# Patient Record
Sex: FEMALE | Race: WHITE | NOT HISPANIC OR LATINO | Employment: FULL TIME | URBAN - METROPOLITAN AREA
[De-identification: names, ages, dates, MRNs, and addresses within clinical notes are randomized per-mention and may not be internally consistent; named-entity substitution may affect disease eponyms.]

---

## 2018-04-12 ENCOUNTER — TRANSCRIBE ORDERS (OUTPATIENT)
Dept: ADMINISTRATIVE | Facility: HOSPITAL | Age: 43
End: 2018-04-12

## 2018-04-12 DIAGNOSIS — H57.02 ANISOCORIA: Primary | ICD-10-CM

## 2018-04-16 ENCOUNTER — APPOINTMENT (OUTPATIENT)
Dept: MRI IMAGING | Facility: HOSPITAL | Age: 43
End: 2018-04-16
Attending: OPHTHALMOLOGY

## 2018-04-18 ENCOUNTER — OFFICE VISIT (OUTPATIENT)
Dept: FAMILY MEDICINE CLINIC | Facility: CLINIC | Age: 43
End: 2018-04-18

## 2018-04-18 VITALS
OXYGEN SATURATION: 99 % | BODY MASS INDEX: 25.69 KG/M2 | HEIGHT: 63 IN | RESPIRATION RATE: 12 BRPM | SYSTOLIC BLOOD PRESSURE: 114 MMHG | HEART RATE: 69 BPM | DIASTOLIC BLOOD PRESSURE: 72 MMHG | WEIGHT: 145 LBS

## 2018-04-18 DIAGNOSIS — Z13.1 SCREENING FOR DIABETES MELLITUS: ICD-10-CM

## 2018-04-18 DIAGNOSIS — Z00.00 ANNUAL PHYSICAL EXAM: Primary | ICD-10-CM

## 2018-04-18 DIAGNOSIS — Z23 NEED FOR VACCINATION: ICD-10-CM

## 2018-04-18 DIAGNOSIS — H57.02 PUPIL DIAMETER UNEQUAL: ICD-10-CM

## 2018-04-18 DIAGNOSIS — Z13.220 SCREENING FOR HYPERLIPIDEMIA: ICD-10-CM

## 2018-04-18 PROCEDURE — 90472 IMMUNIZATION ADMIN EACH ADD: CPT | Performed by: FAMILY MEDICINE

## 2018-04-18 PROCEDURE — 99386 PREV VISIT NEW AGE 40-64: CPT | Performed by: FAMILY MEDICINE

## 2018-04-18 PROCEDURE — 90471 IMMUNIZATION ADMIN: CPT | Performed by: FAMILY MEDICINE

## 2018-04-18 PROCEDURE — 90632 HEPA VACCINE ADULT IM: CPT | Performed by: FAMILY MEDICINE

## 2018-04-18 PROCEDURE — 90715 TDAP VACCINE 7 YRS/> IM: CPT | Performed by: FAMILY MEDICINE

## 2018-04-18 RX ORDER — CETIRIZINE HYDROCHLORIDE 10 MG/1
10 TABLET ORAL DAILY
COMMUNITY

## 2018-04-18 NOTE — PROGRESS NOTES
Subjective   Malathi Lee is a 42 y.o. female. Patient is here today for   Chief Complaint   Patient presents with   • Annual Exam     Physical   • Establish Care   • eye concern            Malathi Lee presents for an Annual Wellness Visit.  she has a history of There is no problem list on file for this patient.      HPI      Health Habits:  Dental Exam. up to date  Eye Exam. up to date  Exercise: 2 times/week.  Current exercise activities include: walking and barre and elliptical   No mental health concerns   and monogamous  Pt would like to get Tdap and Hep A vaccines today  She is not fasting this morning  OB/GYN is Dr. Judith Issa   Pap smear is UTD and WNL per pt report  First mammogram at age 41 was normal per pt report  Menses are regular  Previous PCP Dr. Ball    Eye concerns:  -one of the nurses she works with recently noticed that her pupils are different sizes  -she asked one of the oncologists she works with to examine her; exam normal  -she went to see an Ophthalmologist, Dr. Marilee Vasquez and her exam and ocular imaging were normal  -she has an appointment with a Neuro-ophthalmologist, Dr. Harry at [facility] on 18  -her insurer denied an MRI brain b/c she did not have any other symptoms   -no pain, blurry vision, or nausea  -no hx of head trauma    The following portions of the patient's history were reviewed and updated as appropriate: allergies, current medications, past family history, past medical history, past social history, past surgical history and problem list.    Past Medical History:   Diagnosis Date   • Allergic      Past Surgical History:   Procedure Laterality Date   •  SECTION      2   • MOUTH SURGERY     • VAGINAL DELIVERY     • WISDOM TOOTH EXTRACTION       Family History   Problem Relation Age of Onset   • Adopted: Yes        Allergies no known allergies     Social History     Social History   • Marital status:      Social History Main Topics   • Smoking  status: Former Smoker   • Smokeless tobacco: Never Used      Comment: smoked btwn ages [age]   • Alcohol use Yes      Comment: about 1-4 drinks per week   • Drug use: No   • Sexual activity: Yes     Partners: Male     Birth control/ protection: Surgical      Comment:  had a vasectomy     Social History Narrative    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  Lives at home with her , 3 children, and 1 dog.        Medications:  Zyrtec 10 mg daily       Review of Systems   Constitutional: Negative for fatigue, fever and unexpected weight change.   HENT: Negative for congestion and sore throat.    Eyes: Negative for photophobia, pain, discharge, redness, itching and visual disturbance.        Unequal pupils   Respiratory: Negative for cough and shortness of breath.    Cardiovascular: Negative for chest pain and palpitations.   Gastrointestinal: Negative for abdominal pain, blood in stool, constipation, diarrhea, nausea and vomiting.   Genitourinary: Negative for dysuria and menstrual problem.        Occasional stress incontinence of urine ever since her last pregnancy 10 yr ago     Skin: Negative for rash.   Neurological: Negative for dizziness, weakness, light-headedness, numbness and headaches.   Psychiatric/Behavioral: Negative for dysphoric mood. The patient is not nervous/anxious.          Objective       Vitals:    04/18/18 0805   BP: 114/72   Pulse: 69   Resp: 12   SpO2: 99%   BMI 25    Physical Exam   Constitutional: Vital signs are normal. She appears well-developed and well-nourished. No distress.   HENT:   Head: Normocephalic and atraumatic.   Nose: Nose normal.   Mouth/Throat: Oropharynx is clear and moist.   Eyes: Conjunctivae and EOM are normal. Pupils are unequal.   L pupil larger than R pupil, however both pupils react to light and accomodation simultaneously   Neck: No thyromegaly present.   Cardiovascular: Normal rate, regular rhythm, S1 normal, S2  normal and normal heart sounds.  Exam reveals no gallop and no friction rub.    No murmur heard.  Pulses:       Radial pulses are 2+ on the right side, and 2+ on the left side.   Pulmonary/Chest: Effort normal and breath sounds normal. She has no wheezes. She has no rales.   Abdominal: Soft. Bowel sounds are normal. She exhibits no distension. There is no hepatosplenomegaly. There is no tenderness. There is no rebound and no guarding.   Musculoskeletal: She exhibits no edema or deformity.   Lymphadenopathy:     She has no cervical adenopathy.        Right: No supraclavicular adenopathy present.        Left: No supraclavicular adenopathy present.   Neurological: She is alert. She has normal strength. Gait normal.   Skin: Skin is warm and dry. No cyanosis. Nails show no clubbing.   Psychiatric: She has a normal mood and affect. Her speech is normal and behavior is normal.   Nursing note and vitals reviewed.      ASSESSMENT/PLAN       Problem List Items Addressed This Visit        Other    Pupil diameter unequal      Other Visit Diagnoses     Annual physical exam    -  Primary    Relevant Orders    CBC & Differential    Hemoglobin A1c    Lipid Panel With LDL / HDL Ratio    TSH    Tdap Vaccine Greater Than or Equal To 6yo IM    Hepatitis A Vaccine Adult IM    Comprehensive Metabolic Panel    Need for vaccination        Relevant Orders    Tdap Vaccine Greater Than or Equal To 6yo IM    Hepatitis A Vaccine Adult IM    Screening for diabetes mellitus        Relevant Orders    Hemoglobin A1c    Comprehensive Metabolic Panel    Screening for hyperlipidemia        Relevant Orders    Lipid Panel With LDL / HDL Ratio            Patient Instructions   Try to exercise at least 3 times per week for 30 minutes per session.    Please fill out a record release form.    Please schedule a fasting lab appointment within the next month.      Follow up with Neuroophthalmology as scheduled and please ask them to forward records to this  office.        Return in about 1 year (around 4/18/2019), or if symptoms worsen or fail to improve.

## 2018-04-18 NOTE — PATIENT INSTRUCTIONS
Try to exercise at least 3 times per week for 30 minutes per session.    Please fill out a record release form.    Please schedule a fasting lab appointment within the next month.      Follow up with Neuroophthalmology as scheduled and please ask them to forward records to this office.

## 2018-05-16 ENCOUNTER — TRANSCRIBE ORDERS (OUTPATIENT)
Dept: ADMINISTRATIVE | Facility: HOSPITAL | Age: 43
End: 2018-05-16

## 2018-05-16 DIAGNOSIS — G90.2 HORNERS SYNDROME: Primary | ICD-10-CM

## 2018-05-17 ENCOUNTER — LAB (OUTPATIENT)
Dept: LAB | Facility: HOSPITAL | Age: 43
End: 2018-05-17

## 2018-05-17 DIAGNOSIS — Z13.220 SCREENING FOR HYPERLIPIDEMIA: ICD-10-CM

## 2018-05-17 DIAGNOSIS — Z13.1 SCREENING FOR DIABETES MELLITUS: ICD-10-CM

## 2018-05-17 DIAGNOSIS — Z00.00 ANNUAL PHYSICAL EXAM: ICD-10-CM

## 2018-05-17 LAB
ALBUMIN SERPL-MCNC: 4.3 G/DL (ref 3.5–5.2)
ALBUMIN/GLOB SERPL: 1.5 G/DL
ALP SERPL-CCNC: 50 U/L (ref 39–117)
ALT SERPL W P-5'-P-CCNC: 12 U/L (ref 1–33)
ANION GAP SERPL CALCULATED.3IONS-SCNC: 8.4 MMOL/L
AST SERPL-CCNC: 18 U/L (ref 1–32)
BASOPHILS # BLD AUTO: 0.01 10*3/MM3 (ref 0–0.2)
BASOPHILS NFR BLD AUTO: 0.2 % (ref 0–1.5)
BILIRUB SERPL-MCNC: 0.3 MG/DL (ref 0.1–1.2)
BUN BLD-MCNC: 12 MG/DL (ref 6–20)
BUN/CREAT SERPL: 14.3 (ref 7–25)
CALCIUM SPEC-SCNC: 9.1 MG/DL (ref 8.6–10.5)
CHLORIDE SERPL-SCNC: 106 MMOL/L (ref 98–107)
CHOLEST SERPL-MCNC: 182 MG/DL (ref 0–200)
CO2 SERPL-SCNC: 27.6 MMOL/L (ref 22–29)
CREAT BLD-MCNC: 0.84 MG/DL (ref 0.57–1)
DEPRECATED RDW RBC AUTO: 43.5 FL (ref 37–54)
EOSINOPHIL # BLD AUTO: 0.17 10*3/MM3 (ref 0–0.7)
EOSINOPHIL NFR BLD AUTO: 3.3 % (ref 0.3–6.2)
ERYTHROCYTE [DISTWIDTH] IN BLOOD BY AUTOMATED COUNT: 13.3 % (ref 11.7–13)
GFR SERPL CREATININE-BSD FRML MDRD: 75 ML/MIN/1.73
GLOBULIN UR ELPH-MCNC: 2.8 GM/DL
GLUCOSE BLD-MCNC: 94 MG/DL (ref 65–99)
HBA1C MFR BLD: 5.03 % (ref 4.8–5.6)
HCT VFR BLD AUTO: 42.3 % (ref 35.6–45.5)
HDLC SERPL-MCNC: 55 MG/DL (ref 40–60)
HGB BLD-MCNC: 13.8 G/DL (ref 11.9–15.5)
IMM GRANULOCYTES # BLD: 0.01 10*3/MM3 (ref 0–0.03)
IMM GRANULOCYTES NFR BLD: 0.2 % (ref 0–0.5)
LDLC SERPL CALC-MCNC: 116 MG/DL (ref 0–100)
LDLC/HDLC SERPL: 2.12 {RATIO}
LYMPHOCYTES # BLD AUTO: 1.57 10*3/MM3 (ref 0.9–4.8)
LYMPHOCYTES NFR BLD AUTO: 30.7 % (ref 19.6–45.3)
MCH RBC QN AUTO: 29.2 PG (ref 26.9–32)
MCHC RBC AUTO-ENTMCNC: 32.6 G/DL (ref 32.4–36.3)
MCV RBC AUTO: 89.6 FL (ref 80.5–98.2)
MONOCYTES # BLD AUTO: 0.34 10*3/MM3 (ref 0.2–1.2)
MONOCYTES NFR BLD AUTO: 6.6 % (ref 5–12)
NEUTROPHILS # BLD AUTO: 3.03 10*3/MM3 (ref 1.9–8.1)
NEUTROPHILS NFR BLD AUTO: 59.2 % (ref 42.7–76)
PLATELET # BLD AUTO: 172 10*3/MM3 (ref 140–500)
PMV BLD AUTO: 12.2 FL (ref 6–12)
POTASSIUM BLD-SCNC: 4.6 MMOL/L (ref 3.5–5.2)
PROT SERPL-MCNC: 7.1 G/DL (ref 6–8.5)
RBC # BLD AUTO: 4.72 10*6/MM3 (ref 3.9–5.2)
SODIUM BLD-SCNC: 142 MMOL/L (ref 136–145)
TRIGL SERPL-MCNC: 53 MG/DL (ref 0–150)
TSH SERPL DL<=0.05 MIU/L-ACNC: 2.76 MIU/ML (ref 0.27–4.2)
VLDLC SERPL-MCNC: 10.6 MG/DL (ref 5–40)
WBC NRBC COR # BLD: 5.12 10*3/MM3 (ref 4.5–10.7)

## 2018-05-17 PROCEDURE — 36415 COLL VENOUS BLD VENIPUNCTURE: CPT | Performed by: FAMILY MEDICINE

## 2018-05-17 PROCEDURE — 80061 LIPID PANEL: CPT

## 2018-05-17 PROCEDURE — 83036 HEMOGLOBIN GLYCOSYLATED A1C: CPT

## 2018-05-17 PROCEDURE — 80053 COMPREHEN METABOLIC PANEL: CPT | Performed by: FAMILY MEDICINE

## 2018-05-17 PROCEDURE — 84443 ASSAY THYROID STIM HORMONE: CPT

## 2018-05-17 PROCEDURE — 85025 COMPLETE CBC W/AUTO DIFF WBC: CPT

## 2018-05-18 ENCOUNTER — TELEPHONE (OUTPATIENT)
Dept: FAMILY MEDICINE CLINIC | Facility: CLINIC | Age: 43
End: 2018-05-18

## 2018-05-18 NOTE — TELEPHONE ENCOUNTER
Left VM to let patient know about her lab results and encourage to work on diet and exercise.     ----- Message from Akanksha Beckwith MD sent at 5/18/2018  8:17 AM EDT -----  Please contact pt and let her know that her cholesterol is a little high, but not to the degree that she needs medication at this time.   Exercising regularly and eating a healthy diet rich in fresh produce, fiber, and lean protein but low in added sugar and fat can help manage this problem and reduce the risk for heart disease in the future.    The rest of her labs looked good: her blood sugar, kidney function, liver enzymes, blood counts, and thyroid hormone were normal.

## 2018-05-23 ENCOUNTER — HOSPITAL ENCOUNTER (OUTPATIENT)
Dept: MRI IMAGING | Facility: HOSPITAL | Age: 43
Discharge: HOME | End: 2018-05-23
Admitting: PSYCHIATRY & NEUROLOGY

## 2018-05-23 ENCOUNTER — HOSPITAL ENCOUNTER (OUTPATIENT)
Dept: CT IMAGING | Facility: HOSPITAL | Age: 43
Discharge: HOME | End: 2018-05-23

## 2018-05-23 DIAGNOSIS — G90.2 HORNERS SYNDROME: ICD-10-CM

## 2018-05-23 PROCEDURE — 70553 MRI BRAIN STEM W/O & W/DYE: CPT

## 2018-05-23 PROCEDURE — 0 GADOBENATE DIMEGLUMINE 529 MG/ML SOLUTION: Performed by: PSYCHIATRY & NEUROLOGY

## 2018-05-23 PROCEDURE — A9577 INJ MULTIHANCE: HCPCS | Performed by: PSYCHIATRY & NEUROLOGY

## 2018-05-23 PROCEDURE — 70498 CT ANGIOGRAPHY NECK: CPT

## 2018-05-23 PROCEDURE — 25010000002 IOPAMIDOL 61 % SOLUTION: Performed by: FAMILY MEDICINE

## 2018-05-23 PROCEDURE — 71260 CT THORAX DX C+: CPT

## 2018-05-23 RX ADMIN — IOPAMIDOL 95 ML: 612 INJECTION, SOLUTION INTRAVENOUS at 09:00

## 2018-05-23 RX ADMIN — GADOBENATE DIMEGLUMINE 13 ML: 529 INJECTION, SOLUTION INTRAVENOUS at 08:17

## 2018-06-08 ENCOUNTER — OFFICE VISIT (OUTPATIENT)
Dept: NEUROSURGERY | Facility: CLINIC | Age: 43
End: 2018-06-08

## 2018-06-08 VITALS
HEIGHT: 63 IN | WEIGHT: 141 LBS | DIASTOLIC BLOOD PRESSURE: 60 MMHG | HEART RATE: 68 BPM | RESPIRATION RATE: 16 BRPM | SYSTOLIC BLOOD PRESSURE: 90 MMHG | BODY MASS INDEX: 24.98 KG/M2

## 2018-06-08 DIAGNOSIS — H57.02 PUPIL DIAMETER UNEQUAL: Primary | ICD-10-CM

## 2018-06-08 DIAGNOSIS — H57.02 ANISOCORIA: ICD-10-CM

## 2018-06-08 PROCEDURE — 99243 OFF/OP CNSLTJ NEW/EST LOW 30: CPT | Performed by: NEUROLOGICAL SURGERY

## 2018-06-08 NOTE — PROGRESS NOTES
"Subjective   Patient ID: Malathi Lee is a 42 y.o. female is being seen for consultation today at the request of Akanksha Timmons for pupil inequity since March. She was evaluated by Dr. Smith, then referred to neuro-ophthalmology (Dr. Whitaker) and has undergone CTA, MRI.   History of Present Illness     She presents to the office today for consultation of anisocoria.  She has undergone MRI imaging of the brain as well as CTA neck and CT chest.  The anisocoria was noted incidentally when she was talking to a pharmacist.  She denies any vision changes, including double or blurriness.  She also denies any headaches, sensory changes within the face, any motor or sensory deficits of her extremities.  She is otherwise healthy and feels well.  She also denies any balance or gait problems.  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    She presents accompanied with a friend.        BP 90/60 (BP Location: Right arm, Patient Position: Sitting, Cuff Size: Adult)   Pulse 68   Resp 16   Ht 160 cm (63\")   Wt 64 kg (141 lb)   BMI 24.98 kg/m²         The following portions of the patient's history were reviewed and updated as appropriate: allergies, current medications, past family history, past medical history, past social history, past surgical history and problem list.    Review of Systems   Constitutional: Negative for fever.   HENT: Negative for tinnitus and trouble swallowing.    Eyes: Negative for visual disturbance.        Anasacoria   Respiratory: Negative for cough and wheezing.    Cardiovascular: Negative for chest pain and palpitations.   Gastrointestinal: Negative for abdominal pain.   Genitourinary: Negative for difficulty urinating.   Musculoskeletal: Negative for gait problem.   Skin: Negative for rash.   Neurological: Negative for dizziness, speech difficulty, light-headedness and headaches.   Psychiatric/Behavioral: Negative for decreased concentration. "       Objective   Physical Exam   Constitutional: She is oriented to person, place, and time. Vital signs are normal. She appears well-developed and well-nourished. She is cooperative.   HENT:   Head: Normocephalic and atraumatic.   Eyes: EOM are normal. Pupils are equal, round, and reactive to light.   Neck: Neck supple. No tracheal deviation present.   Cardiovascular: Normal rate and intact distal pulses.    Pulmonary/Chest: Effort normal and breath sounds normal.   Abdominal: Soft.   Musculoskeletal: Normal range of motion. She exhibits no tenderness or deformity.   Normal strength throughout   Neurological: She is alert and oriented to person, place, and time. She has normal strength. She displays no tremor and normal reflexes. No cranial nerve deficit or sensory deficit. She has a normal Finger-Nose-Finger Test, a normal Heel to Shin Test, a normal Romberg Test and a normal Tandem Gait Test. Gait normal. Coordination and gait normal. GCS eye subscore is 4. GCS verbal subscore is 5. GCS motor subscore is 6.   Reflex Scores:       Tricep reflexes are 2+ on the right side and 2+ on the left side.       Bicep reflexes are 2+ on the right side and 2+ on the left side.       Brachioradialis reflexes are 2+ on the right side and 2+ on the left side.       Patellar reflexes are 2+ on the right side and 2+ on the left side.       Achilles reflexes are 2+ on the right side and 2+ on the left side.  Stable upright gait, normal station  Able to heel and toe walk, able to tandem  Normal motor and sensory examination throughout  Cranial nerves II through XII grossly intact   Skin: Skin is warm and dry.   Psychiatric: She has a normal mood and affect. Her speech is normal and behavior is normal. Thought content normal.   Vitals reviewed.    Neurologic Exam     Mental Status   Oriented to person, place, and time.   Oriented to person.   Oriented to place.   Attention: normal. Concentration: normal.   Speech: speech is normal    Level of consciousness: alert    Cranial Nerves     CN II   Visual fields full to confrontation.   Visual acuity: normal  Right visual field deficit: none  Left visual field deficit: none     CN III, IV, VI   Pupils are equal, round, and reactive to light.  Extraocular motions are normal.   Right pupil: Size: 3 mm. Shape: regular. Reactivity: brisk. Consensual response: intact.   Left pupil: Size: 5 mm. Shape: regular. Reactivity: brisk.   CN III: no CN III palsy  CN VI: no CN VI palsy  Nystagmus: none     CN V   Facial sensation intact.   Right facial sensation deficit: none  Left facial sensation deficit: none    CN VII   Facial expression full, symmetric.   Right facial weakness: none  Left facial weakness: none    CN VIII   CN VIII normal.   Hearing: intact    CN XI   CN XI normal.     CN XII   CN XII normal.   Tongue: not atrophic    Motor Exam     Strength   Strength 5/5 throughout.     Gait, Coordination, and Reflexes     Gait  Gait: normal    Coordination   Romberg: negative  Finger to nose coordination: normal  Heel to shin coordination: normal  Tandem walking coordination: normal    Tremor   Resting tremor: absent    Reflexes   Right brachioradialis: 2+  Left brachioradialis: 2+  Right biceps: 2+  Left biceps: 2+  Right triceps: 2+  Left triceps: 2+  Right patellar: 2+  Left patellar: 2+  Right achilles: 2+  Left achilles: 2+      Assessment/Plan   Independent Review of Radiographic Studies:      I personally reviewed the risks CTA of the neck as well as an MRI of brain that were done recently.  This was a standard MRI of brain.  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  This is a normal variant.  The MRI of brain demonstrates no evidence of tumor.    Medical Decision Making:    I confirmed and obtained the above history as recorded by the nurse practitioner acting as a scribe. I performed the above examination  and it is documented by the nurse practitioner acting as a scribe.    Patient presents with isolated left pupillary dilation and, on exam, slight ptosis of the left lid.  She explains that she has had her left lid ptosis her entire life and is willing to demonstrate this with multiple photographs from early on.  I declined to view all of these photographs.    I explained to the patient that left pupillary dilation can be a result of pressure on the third nerve or intrinsic third nerve disease.  Rule out the possibility of a cerebral aneurysm I'm going to order some other tests.  I anticipate seeing her back following these tests.      Malathi was seen today for pupil inequity.    Diagnoses and all orders for this visit:    Pupil diameter unequal    Anisocoria  -     MRI Angiogram Head Without Contrast; Future  -     MRI Brain With & Without Contrast; Future      Return for Follow up after testing.

## 2018-06-19 ENCOUNTER — HOSPITAL ENCOUNTER (OUTPATIENT)
Dept: MRI IMAGING | Facility: HOSPITAL | Age: 43
End: 2018-06-19
Attending: NEUROLOGICAL SURGERY

## 2018-06-27 ENCOUNTER — HOSPITAL ENCOUNTER (OUTPATIENT)
Dept: MRI IMAGING | Facility: HOSPITAL | Age: 43
Discharge: HOME | End: 2018-06-27
Attending: NEUROLOGICAL SURGERY

## 2018-06-27 ENCOUNTER — HOSPITAL ENCOUNTER (OUTPATIENT)
Dept: MRI IMAGING | Facility: HOSPITAL | Age: 43
Discharge: HOME | End: 2018-06-27
Attending: NEUROLOGICAL SURGERY | Admitting: NEUROLOGICAL SURGERY

## 2018-06-27 DIAGNOSIS — H57.02 ANISOCORIA: ICD-10-CM

## 2018-06-27 PROCEDURE — 70553 MRI BRAIN STEM W/O & W/DYE: CPT

## 2018-06-27 PROCEDURE — 0 GADOBENATE DIMEGLUMINE 529 MG/ML SOLUTION: Performed by: NEUROLOGICAL SURGERY

## 2018-06-27 PROCEDURE — 70544 MR ANGIOGRAPHY HEAD W/O DYE: CPT

## 2018-06-27 PROCEDURE — A9577 INJ MULTIHANCE: HCPCS | Performed by: NEUROLOGICAL SURGERY

## 2018-06-27 RX ADMIN — GADOBENATE DIMEGLUMINE 13 ML: 529 INJECTION, SOLUTION INTRAVENOUS at 08:43

## 2018-07-20 ENCOUNTER — OFFICE VISIT (OUTPATIENT)
Dept: NEUROSURGERY | Facility: CLINIC | Age: 43
End: 2018-07-20

## 2018-07-20 VITALS
BODY MASS INDEX: 24.98 KG/M2 | DIASTOLIC BLOOD PRESSURE: 60 MMHG | SYSTOLIC BLOOD PRESSURE: 90 MMHG | WEIGHT: 141 LBS | HEIGHT: 63 IN

## 2018-07-20 DIAGNOSIS — H57.02 PUPIL DIAMETER UNEQUAL: Primary | ICD-10-CM

## 2018-07-20 PROCEDURE — 99213 OFFICE O/P EST LOW 20 MIN: CPT | Performed by: NEUROLOGICAL SURGERY

## 2018-07-20 NOTE — PROGRESS NOTES
Subjective   Patient ID: Malathi Lee is a 43 y.o. female who is here today for follow-up of for unequal pupils. She had an MRI of the brain and an MRA of the head at [facility] on 6/27/18. She presents unaccompanied.    History of Present Illness     She returns the office with no particular symptomatology except large pupil in the left side at rest.  She denies any particular symptomatology with regard to this.    The following portions of the patient's history were reviewed and updated as appropriate: allergies, current medications, past family history, past medical history, past social history, past surgical history and problem list.    Review of Systems   Eyes: Negative for visual disturbance.   Musculoskeletal: Negative for gait problem.   Neurological: Negative for dizziness, weakness and headaches.       Objective   Physical Exam   Eyes: Pupils are unequal.     Neurologic Exam     Cranial Nerves     CN II   Visual acuity: normal  Right visual field deficit: none  Left visual field deficit: none     CN III, IV, VI   Pupils: unequal  Right pupil: Shape: regular. Reactivity: brisk. Consensual response: intact. Accommodation: intact.   Left pupil: Shape: regular. Reactivity: brisk. Consensual response: intact. Accommodation: intact.   Nystagmus: none   Diplopia: none  Upgaze: normal  Downgaze: normal  Conjugate gaze: present  Vestibulo-ocular reflex: present    CN V   Right facial sensation deficit: none  Left facial sensation deficit: none    CN VII   Right facial weakness: none  Left facial weakness: none    CN VIII   Hearing: intact    CN IX, X   CN IX normal.   CN X normal.     CN XI   CN XI normal.     CN XII   CN XII normal.       Assessment/Plan   Independent Review of Radiographic Studies:    I personally reviewed the MRI and MRA of brain done recently: These are strictly within normal limits.  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  Completely normal.  There are no vessels near  the cranial nerves that would subserve pupillary responsiveness.    Medical Decision Making:    The patient returns to the office today with normal imaging of the brain.    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    Nonetheless it is really nothing to do about this except to wear sunglasses when she is out side.    Will order a VDRL for thoroughness.    Malathi was seen today for unequal pupil.    Diagnoses and all orders for this visit:    Pupil diameter unequal  -     RPR; Future      Return if symptoms worsen or fail to improve.

## 2018-07-25 ENCOUNTER — RESULTS ENCOUNTER (OUTPATIENT)
Dept: NEUROSURGERY | Facility: CLINIC | Age: 43
End: 2018-07-25

## 2018-07-25 DIAGNOSIS — H57.02 PUPIL DIAMETER UNEQUAL: ICD-10-CM

## 2018-11-27 ENCOUNTER — LAB REQUISITION (OUTPATIENT)
Dept: LAB | Facility: HOSPITAL | Age: 43
End: 2018-11-27

## 2018-11-27 DIAGNOSIS — Z00.00 ENCOUNTER FOR GENERAL ADULT MEDICAL EXAMINATION WITHOUT ABNORMAL FINDINGS: ICD-10-CM

## 2018-11-27 PROCEDURE — 87205 SMEAR GRAM STAIN: CPT | Performed by: DERMATOLOGY

## 2018-11-27 PROCEDURE — 87147 CULTURE TYPE IMMUNOLOGIC: CPT | Performed by: DERMATOLOGY

## 2018-11-27 PROCEDURE — 87070 CULTURE OTHR SPECIMN AEROBIC: CPT | Performed by: DERMATOLOGY

## 2018-11-29 LAB
BACTERIA SPEC AEROBE CULT: ABNORMAL
GRAM STN SPEC: ABNORMAL

## 2018-12-05 ENCOUNTER — APPOINTMENT (OUTPATIENT)
Dept: WOMENS IMAGING | Facility: HOSPITAL | Age: 43
End: 2018-12-05

## 2018-12-05 PROCEDURE — 77063 BREAST TOMOSYNTHESIS BI: CPT | Performed by: RADIOLOGY

## 2018-12-05 PROCEDURE — 77067 SCR MAMMO BI INCL CAD: CPT | Performed by: RADIOLOGY

## 2019-09-13 ENCOUNTER — OFFICE VISIT (OUTPATIENT)
Dept: FAMILY MEDICINE CLINIC | Facility: CLINIC | Age: 44
End: 2019-09-13

## 2019-09-13 VITALS
HEIGHT: 63 IN | RESPIRATION RATE: 14 BRPM | SYSTOLIC BLOOD PRESSURE: 100 MMHG | DIASTOLIC BLOOD PRESSURE: 60 MMHG | OXYGEN SATURATION: 98 % | HEART RATE: 75 BPM | BODY MASS INDEX: 25.82 KG/M2 | WEIGHT: 145.7 LBS

## 2019-09-13 DIAGNOSIS — R53.83 FATIGUE, UNSPECIFIED TYPE: Primary | ICD-10-CM

## 2019-09-13 DIAGNOSIS — F32.0 CURRENT MILD EPISODE OF MAJOR DEPRESSIVE DISORDER WITHOUT PRIOR EPISODE (HCC): ICD-10-CM

## 2019-09-13 DIAGNOSIS — R42 DIZZINESS: ICD-10-CM

## 2019-09-13 LAB
ALBUMIN SERPL-MCNC: 4.9 G/DL (ref 3.5–5.2)
ALBUMIN/GLOB SERPL: 1.8 G/DL
ALP SERPL-CCNC: 51 U/L (ref 39–117)
ALT SERPL-CCNC: 13 U/L (ref 1–33)
AST SERPL-CCNC: 17 U/L (ref 1–32)
BILIRUB SERPL-MCNC: 0.4 MG/DL (ref 0.2–1.2)
BUN SERPL-MCNC: 9 MG/DL (ref 6–20)
BUN/CREAT SERPL: 12 (ref 7–25)
CALCIUM SERPL-MCNC: 9.7 MG/DL (ref 8.6–10.5)
CHLORIDE SERPL-SCNC: 102 MMOL/L (ref 98–107)
CO2 SERPL-SCNC: 25.3 MMOL/L (ref 22–29)
CREAT SERPL-MCNC: 0.75 MG/DL (ref 0.57–1)
ERYTHROCYTE [DISTWIDTH] IN BLOOD BY AUTOMATED COUNT: 13.1 % (ref 12.3–15.4)
GLOBULIN SER CALC-MCNC: 2.7 GM/DL
GLUCOSE SERPL-MCNC: 83 MG/DL (ref 65–99)
HCT VFR BLD AUTO: 45.8 % (ref 34–46.6)
HGB BLD-MCNC: 14.4 G/DL (ref 12–15.9)
MCH RBC QN AUTO: 29 PG (ref 26.6–33)
MCHC RBC AUTO-ENTMCNC: 31.4 G/DL (ref 31.5–35.7)
MCV RBC AUTO: 92.3 FL (ref 79–97)
PLATELET # BLD AUTO: 167 10*3/MM3 (ref 140–450)
POTASSIUM SERPL-SCNC: 4.6 MMOL/L (ref 3.5–5.2)
PROT SERPL-MCNC: 7.6 G/DL (ref 6–8.5)
RBC # BLD AUTO: 4.96 10*6/MM3 (ref 3.77–5.28)
SODIUM SERPL-SCNC: 141 MMOL/L (ref 136–145)
TSH SERPL DL<=0.005 MIU/L-ACNC: 3.34 UIU/ML (ref 0.27–4.2)
WBC # BLD AUTO: 5.33 10*3/MM3 (ref 3.4–10.8)

## 2019-09-13 PROCEDURE — 99213 OFFICE O/P EST LOW 20 MIN: CPT | Performed by: NURSE PRACTITIONER

## 2019-09-13 RX ORDER — FLUTICASONE PROPIONATE 50 MCG
2 SPRAY, SUSPENSION (ML) NASAL DAILY
Qty: 1 BOTTLE
Start: 2019-09-13 | End: 2022-12-29

## 2019-09-13 NOTE — PROGRESS NOTES
Subjective   Malathi Lee is a 44 y.o. female. She was previously seen by Dr. Beckwith, but is new to me.     History of Present Illness   Pt is being seen today for c/o generalized fatigue and intermittent dizziness. She states that these symptoms started about 2 weeks ago. She states that she feels better during the day, but by the end of the day she is exhausted. She denies fever, chills or any weight loss or gain that was unexpected. Has had a lot of stress with two recent deaths in the family.     Pt reports that she's been a depressed lately which is related to the deaths of her two family members is job related.  She reports decreased concentration, but no sleep disturbance and denies any suicidal thoughts or planning.     The following portions of the patient's history were reviewed and updated as appropriate: allergies, current medications, past family history, past medical history, past social history, past surgical history and problem list.    Review of Systems   Constitutional: Positive for fatigue. Negative for appetite change, chills, fever, unexpected weight gain and unexpected weight loss.   HENT: Negative for congestion, postnasal drip and sinus pressure.    Eyes: Negative for blurred vision and visual disturbance.   Respiratory: Negative for cough, chest tightness and shortness of breath.    Cardiovascular: Negative for chest pain, palpitations and leg swelling.   Genitourinary: Negative for frequency and urgency.   Musculoskeletal: Positive for back pain and neck pain.   Allergic/Immunologic: Positive for environmental allergies.   Neurological: Positive for dizziness and headache. Negative for weakness and numbness.   Psychiatric/Behavioral: Positive for decreased concentration, depressed mood and stress. Negative for sleep disturbance and suicidal ideas. The patient is not nervous/anxious.        Objective   Physical Exam   Constitutional: She is oriented to person, place, and time. She  appears well-developed and well-nourished.   HENT:   Head: Normocephalic and atraumatic.   Right Ear: External ear normal. A middle ear effusion is present.   Left Ear: External ear normal. A middle ear effusion is present.   Nose: Nose normal.   Mouth/Throat: Oropharynx is clear and moist. No oropharyngeal exudate.   Eyes: Conjunctivae and EOM are normal. Right eye exhibits no discharge. Left eye exhibits no discharge.   Left pupil larger than right. Known benign anisocoria.    Neck: Normal range of motion. Neck supple. No thyromegaly present.   Cardiovascular: Normal rate, regular rhythm, normal heart sounds and intact distal pulses.   No murmur heard.  Pulmonary/Chest: Effort normal and breath sounds normal. No tachypnea. No respiratory distress. She has no decreased breath sounds. She has no wheezes. She has no rales.   Lymphadenopathy:     She has no cervical adenopathy.   Neurological: She is alert and oriented to person, place, and time.   Skin: Skin is warm and dry.   Psychiatric: Her speech is normal and behavior is normal. Judgment and thought content normal. Her mood appears not anxious. Her affect is not inappropriate. Cognition and memory are normal. She exhibits a depressed mood.   Nursing note and vitals reviewed.      Vitals:    09/13/19 0822   BP: 100/60   Pulse: 75   Resp: 14   SpO2: 98%     Body mass index is 25.81 kg/m².    Procedures    Assessment/Plan   Problems Addressed this Visit     None      Visit Diagnoses     Fatigue, unspecified type    -  Primary    Relevant Orders    CBC (No Diff)    TSH    Dizziness        Relevant Orders    Comprehensive Metabolic Panel    Current mild episode of major depressive disorder without prior episode (CMS/HCC)          Referred patient to [facility].   Follow-up as needed or if symptoms worsen.

## 2020-12-17 ENCOUNTER — APPOINTMENT (OUTPATIENT)
Dept: WOMENS IMAGING | Facility: HOSPITAL | Age: 45
End: 2020-12-17

## 2020-12-17 PROCEDURE — 77063 BREAST TOMOSYNTHESIS BI: CPT | Performed by: RADIOLOGY

## 2020-12-17 PROCEDURE — 77067 SCR MAMMO BI INCL CAD: CPT | Performed by: RADIOLOGY

## 2020-12-28 ENCOUNTER — IMMUNIZATION (OUTPATIENT)
Dept: VACCINE CLINIC | Facility: HOSPITAL | Age: 45
End: 2020-12-28

## 2020-12-28 PROCEDURE — 0001A: CPT | Performed by: INTERNAL MEDICINE

## 2020-12-28 PROCEDURE — 91300 HC SARSCOV02 VAC 30MCG/0.3ML IM: CPT | Performed by: INTERNAL MEDICINE

## 2021-01-18 ENCOUNTER — IMMUNIZATION (OUTPATIENT)
Dept: VACCINE CLINIC | Facility: HOSPITAL | Age: 46
End: 2021-01-18

## 2021-01-18 PROCEDURE — 91300 HC SARSCOV02 VAC 30MCG/0.3ML IM: CPT | Performed by: INTERNAL MEDICINE

## 2021-01-18 PROCEDURE — 0002A: CPT | Performed by: INTERNAL MEDICINE

## 2021-08-24 DIAGNOSIS — Z13.220 SCREENING FOR LIPOID DISORDERS: ICD-10-CM

## 2021-08-24 DIAGNOSIS — Z11.59 NEED FOR HEPATITIS C SCREENING TEST: ICD-10-CM

## 2021-08-24 DIAGNOSIS — Z00.00 ROUTINE GENERAL MEDICAL EXAMINATION AT A HEALTH CARE FACILITY: Primary | ICD-10-CM

## 2021-08-25 LAB
ALBUMIN SERPL-MCNC: 4.3 G/DL (ref 3.5–5.2)
ALBUMIN/GLOB SERPL: 1.7 G/DL
ALP SERPL-CCNC: 64 U/L (ref 39–117)
ALT SERPL-CCNC: 11 U/L (ref 1–33)
AST SERPL-CCNC: 19 U/L (ref 1–32)
BASOPHILS # BLD AUTO: 0.02 10*3/MM3 (ref 0–0.2)
BASOPHILS NFR BLD AUTO: 0.4 % (ref 0–1.5)
BILIRUB SERPL-MCNC: 0.3 MG/DL (ref 0–1.2)
BUN SERPL-MCNC: 15 MG/DL (ref 6–20)
BUN/CREAT SERPL: 16.9 (ref 7–25)
CALCIUM SERPL-MCNC: 9.4 MG/DL (ref 8.6–10.5)
CHLORIDE SERPL-SCNC: 107 MMOL/L (ref 98–107)
CHOLEST SERPL-MCNC: 201 MG/DL (ref 0–200)
CO2 SERPL-SCNC: 23.1 MMOL/L (ref 22–29)
CREAT SERPL-MCNC: 0.89 MG/DL (ref 0.57–1)
EOSINOPHIL # BLD AUTO: 0.17 10*3/MM3 (ref 0–0.4)
EOSINOPHIL NFR BLD AUTO: 3.4 % (ref 0.3–6.2)
ERYTHROCYTE [DISTWIDTH] IN BLOOD BY AUTOMATED COUNT: 12.8 % (ref 12.3–15.4)
GLOBULIN SER CALC-MCNC: 2.6 GM/DL
GLUCOSE SERPL-MCNC: 88 MG/DL (ref 65–99)
HCT VFR BLD AUTO: 40.5 % (ref 34–46.6)
HCV AB S/CO SERPL IA: <0.1 S/CO RATIO (ref 0–0.9)
HDLC SERPL-MCNC: 51 MG/DL (ref 40–60)
HGB BLD-MCNC: 13.4 G/DL (ref 12–15.9)
IMM GRANULOCYTES # BLD AUTO: 0.02 10*3/MM3 (ref 0–0.05)
IMM GRANULOCYTES NFR BLD AUTO: 0.4 % (ref 0–0.5)
LDLC SERPL CALC-MCNC: 138 MG/DL (ref 0–100)
LDLC/HDLC SERPL: 2.68 {RATIO}
LYMPHOCYTES # BLD AUTO: 1.5 10*3/MM3 (ref 0.7–3.1)
LYMPHOCYTES NFR BLD AUTO: 29.8 % (ref 19.6–45.3)
MCH RBC QN AUTO: 29.8 PG (ref 26.6–33)
MCHC RBC AUTO-ENTMCNC: 33.1 G/DL (ref 31.5–35.7)
MCV RBC AUTO: 90.2 FL (ref 79–97)
MONOCYTES # BLD AUTO: 0.35 10*3/MM3 (ref 0.1–0.9)
MONOCYTES NFR BLD AUTO: 6.9 % (ref 5–12)
NEUTROPHILS # BLD AUTO: 2.98 10*3/MM3 (ref 1.7–7)
NEUTROPHILS NFR BLD AUTO: 59.1 % (ref 42.7–76)
NRBC BLD AUTO-RTO: 0 /100 WBC (ref 0–0.2)
PLATELET # BLD AUTO: 181 10*3/MM3 (ref 140–450)
POTASSIUM SERPL-SCNC: 4.3 MMOL/L (ref 3.5–5.2)
PROT SERPL-MCNC: 6.9 G/DL (ref 6–8.5)
RBC # BLD AUTO: 4.49 10*6/MM3 (ref 3.77–5.28)
SODIUM SERPL-SCNC: 141 MMOL/L (ref 136–145)
TRIGL SERPL-MCNC: 66 MG/DL (ref 0–150)
VLDLC SERPL CALC-MCNC: 12 MG/DL (ref 5–40)
WBC # BLD AUTO: 5.04 10*3/MM3 (ref 3.4–10.8)

## 2021-09-02 ENCOUNTER — OFFICE VISIT (OUTPATIENT)
Dept: FAMILY MEDICINE CLINIC | Facility: CLINIC | Age: 46
End: 2021-09-02

## 2021-09-02 VITALS
SYSTOLIC BLOOD PRESSURE: 110 MMHG | RESPIRATION RATE: 16 BRPM | DIASTOLIC BLOOD PRESSURE: 76 MMHG | OXYGEN SATURATION: 100 % | HEIGHT: 63 IN | BODY MASS INDEX: 29.06 KG/M2 | WEIGHT: 164 LBS | HEART RATE: 72 BPM

## 2021-09-02 DIAGNOSIS — R63.5 WEIGHT GAIN: ICD-10-CM

## 2021-09-02 DIAGNOSIS — Z13.220 SCREENING FOR HYPERLIPIDEMIA: ICD-10-CM

## 2021-09-02 DIAGNOSIS — Z13.228 ENCOUNTER FOR SCREENING FOR OTHER METABOLIC DISORDERS: ICD-10-CM

## 2021-09-02 DIAGNOSIS — Z12.11 SCREEN FOR COLON CANCER: ICD-10-CM

## 2021-09-02 DIAGNOSIS — Z00.00 ANNUAL PHYSICAL EXAM: Primary | ICD-10-CM

## 2021-09-02 PROCEDURE — 99396 PREV VISIT EST AGE 40-64: CPT | Performed by: NURSE PRACTITIONER

## 2021-09-02 NOTE — PROGRESS NOTES
Preventive Exam    History of Present Illness: Malathi Lee is a 46 y.o. here for check up and review of routine health maintenance. she states she is doing well and has no concerns.    Patient is concerned regarding weight gain. She reports that she has not changed her diet and is frustrated at the weight gain. She admits to drinking 5-6 alcoholic drinks on the weekend. She does not exercise regularly. Patient's weight has increased approximately 20 lbs since she was last seen.     Past medical history, surgical history and family history have been reviewed.     Review of Systems   Constitutional: Positive for fatigue and unexpected weight gain. Negative for appetite change, chills, fever and unexpected weight loss.   HENT: Negative for congestion, dental problem, postnasal drip, rhinorrhea, sinus pressure, sore throat and tinnitus.         Dental exam is up to date.    Eyes: Negative.  Negative for blurred vision, double vision and photophobia.        Eye exam is up to date.    Respiratory: Negative for cough, chest tightness, shortness of breath and wheezing.    Cardiovascular: Negative for chest pain, palpitations and leg swelling.   Gastrointestinal: Negative for abdominal pain, constipation, diarrhea, nausea, vomiting and GERD.   Endocrine: Negative for cold intolerance, heat intolerance, polydipsia, polyphagia and polyuria.   Genitourinary: Negative for breast discharge, breast lump, breast pain, dysuria, frequency, pelvic pain, pelvic pressure, vaginal bleeding and vaginal discharge.   Musculoskeletal: Positive for arthralgias (Bilateral heel pain). Negative for back pain, joint swelling and myalgias.   Skin: Negative.  Negative for color change and rash.   Allergic/Immunologic: Negative.  Negative for environmental allergies and food allergies.   Neurological: Negative for dizziness, weakness, numbness and headache.   Hematological: Negative.  Does not bruise/bleed easily.   Psychiatric/Behavioral:  Negative.  Negative for suicidal ideas, depressed mood and stress. The patient is not nervous/anxious.        PHYSICAL EXAM    Vitals:    09/02/21 1000   BP: 110/76   Pulse: 72   Resp: 16   SpO2: 100%   Body mass index is 29.06 kg/m².    Physical Exam  Vitals and nursing note reviewed.   Constitutional:       Appearance: Normal appearance. She is well-developed and normal weight.   HENT:      Head: Normocephalic and atraumatic.      Right Ear: Tympanic membrane, ear canal and external ear normal.      Left Ear: Tympanic membrane, ear canal and external ear normal.      Nose: Nose normal.      Mouth/Throat:      Lips: Pink.      Mouth: Mucous membranes are moist.      Tongue: No lesions.      Palate: No mass and lesions.      Pharynx: Oropharynx is clear. Uvula midline.      Tonsils: No tonsillar exudate.   Eyes:      Conjunctiva/sclera: Conjunctivae normal.      Pupils: Pupils are equal, round, and reactive to light.   Neck:      Thyroid: No thyromegaly.   Cardiovascular:      Rate and Rhythm: Normal rate and regular rhythm.      Pulses: Normal pulses.           Dorsalis pedis pulses are 2+ on the right side and 2+ on the left side.        Posterior tibial pulses are 2+ on the right side and 2+ on the left side.      Heart sounds: Normal heart sounds. No murmur heard.     Pulmonary:      Effort: Pulmonary effort is normal.      Breath sounds: Normal breath sounds.   Abdominal:      General: Bowel sounds are normal. There is no distension.      Palpations: Abdomen is soft.      Tenderness: There is no abdominal tenderness.   Musculoskeletal:         General: No deformity. Normal range of motion.      Cervical back: Normal range of motion and neck supple.      Right lower leg: No edema.      Left lower leg: No edema.   Lymphadenopathy:      Head:      Right side of head: No submental, submandibular, tonsillar, preauricular, posterior auricular or occipital adenopathy.      Left side of head: No submental,  submandibular, tonsillar, preauricular, posterior auricular or occipital adenopathy.      Cervical: No cervical adenopathy.      Right cervical: No superficial, deep or posterior cervical adenopathy.     Left cervical: No superficial, deep or posterior cervical adenopathy.      Upper Body:      Right upper body: No supraclavicular adenopathy.      Left upper body: No supraclavicular adenopathy.   Skin:     General: Skin is warm and dry.      Capillary Refill: Capillary refill takes 2 to 3 seconds.   Neurological:      General: No focal deficit present.      Mental Status: She is alert and oriented to person, place, and time.      Cranial Nerves: Cranial nerves are intact. No cranial nerve deficit.      Sensory: Sensation is intact.      Motor: Motor function is intact.      Coordination: Coordination is intact.      Gait: Gait is intact.   Psychiatric:         Attention and Perception: Attention and perception normal.         Mood and Affect: Mood and affect normal.         Speech: Speech normal.         Behavior: Behavior normal. Behavior is cooperative.         Thought Content: Thought content normal.         Cognition and Memory: Cognition and memory normal.         Judgment: Judgment normal.         Procedures    Diagnoses and all orders for this visit:    1. Annual physical exam (Primary)  -     Cancel: CBC & Differential  -     Cancel: Comprehensive Metabolic Panel  -     Cancel: Lipid Panel With / Chol / HDL Ratio  -     TSH    2. Screening for hyperlipidemia  -     Cancel: Lipid Panel With / Chol / HDL Ratio    3. Encounter for screening for other metabolic disorders  -     Cancel: CBC & Differential  -     Cancel: Comprehensive Metabolic Panel    4. Weight gain  -     TSH  -     T4, Free  -     T3, Free    5. Screen for colon cancer  -     Cologuard - Stool, Per Rectum; Future        Problems Addressed this Visit     None      Visit Diagnoses     Annual physical exam    -  Primary    Relevant Orders    TSH     Screening for hyperlipidemia        Encounter for screening for other metabolic disorders        Weight gain        Relevant Orders    TSH    T4, Free    T3, Free    Screen for colon cancer        Relevant Orders    Cologuard - Stool, Per Rectum      Diagnoses       Codes Comments    Annual physical exam    -  Primary ICD-10-CM: Z00.00  ICD-9-CM: V70.0     Screening for hyperlipidemia     ICD-10-CM: Z13.220  ICD-9-CM: V77.91     Encounter for screening for other metabolic disorders     ICD-10-CM: Z13.228  ICD-9-CM: V77.99     Weight gain     ICD-10-CM: R63.5  ICD-9-CM: 783.1     Screen for colon cancer     ICD-10-CM: Z12.11  ICD-9-CM: V76.51       CMP  CBC  TSH  Cologuard  Lipid panel    Routine health maintenance reviewed and discussed with Malathi Lee.    Preventative counseling regarding healthy diet and exercise.   Pt reports that he wears a seatbelt regularly.  Return in about 1 year (around 2022) for Annual, Labs.     Patient Instructions       Annual Wellness  Personal Prevention Plan of Service     Date of Office Visit:  2021  Encounter Provider:  VIRGINIE Burns  Place of Service:  [facility]  Patient Name: Malathi Lee  :  1975    As part of the Annual Wellness portion of your visit today, we are providing you with this personalized preventive plan of services (PPPS). This plan is based upon recommendations of the [place] Preventive Services Task Force (USPSTF) and the Advisory Committee on Immunization Practices (ACIP).    This lists the preventive care services that should be considered, and provides dates of when you are due. Items listed as completed are up-to-date and do not require any further intervention.    Health Maintenance   Topic Date Due   • COLORECTAL CANCER SCREENING  Never done   • INFLUENZA VACCINE  10/01/2021   • MAMMOGRAM  2021   • LIPID PANEL  2022   • ANNUAL PHYSICAL  2022   • PAP SMEAR   11/27/2023   • TDAP/TD VACCINES (2 - Td or Tdap) 04/18/2028   • HEPATITIS C SCREENING  Completed   • COVID-19 Vaccine  Completed   • Pneumococcal Vaccine 0-64  Aged Out       Orders Placed This Encounter   Procedures   • Cologuard - Stool, Per Rectum     Standing Status:   Future     Standing Expiration Date:   9/2/2022     Order Specific Question:   Release to patient     Answer:   Immediate   • Comprehensive Metabolic Panel     Order Specific Question:   Release to patient     Answer:   Immediate   • Lipid Panel With / Chol / HDL Ratio     Order Specific Question:   Release to patient     Answer:   Immediate   • TSH     Order Specific Question:   Release to patient     Answer:   Immediate   • CBC & Differential     Order Specific Question:   Manual Differential     Answer:   No       Return in about 1 year (around 9/2/2022) for Annual, Labs.

## 2021-09-02 NOTE — PATIENT INSTRUCTIONS
Annual Wellness  Personal Prevention Plan of Service     Date of Office Visit:  2021  Encounter Provider:  VIRGINIE Burns  Place of Service:  [facility]  Patient Name: Malathi Lee  :  1975    As part of the Annual Wellness portion of your visit today, we are providing you with this personalized preventive plan of services (PPPS). This plan is based upon recommendations of the [place] Preventive Services Task Force (USPSTF) and the Advisory Committee on Immunization Practices (ACIP).    This lists the preventive care services that should be considered, and provides dates of when you are due. Items listed as completed are up-to-date and do not require any further intervention.    Health Maintenance   Topic Date Due   • COLORECTAL CANCER SCREENING  Never done   • INFLUENZA VACCINE  10/01/2021   • MAMMOGRAM  2021   • LIPID PANEL  2022   • ANNUAL PHYSICAL  2022   • PAP SMEAR  2023   • TDAP/TD VACCINES (2 - Td or Tdap) 2028   • HEPATITIS C SCREENING  Completed   • COVID-19 Vaccine  Completed   • Pneumococcal Vaccine 0-64  Aged Out       Orders Placed This Encounter   Procedures   • Cologuard - Stool, Per Rectum     Standing Status:   Future     Standing Expiration Date:   2022     Order Specific Question:   Release to patient     Answer:   Immediate   • Comprehensive Metabolic Panel     Order Specific Question:   Release to patient     Answer:   Immediate   • Lipid Panel With / Chol / HDL Ratio     Order Specific Question:   Release to patient     Answer:   Immediate   • TSH     Order Specific Question:   Release to patient     Answer:   Immediate   • CBC & Differential     Order Specific Question:   Manual Differential     Answer:   No       Return in about 1 year (around 2022) for Annual, Labs.

## 2021-09-03 LAB
T3FREE SERPL-MCNC: 3 PG/ML (ref 2–4.4)
T4 FREE SERPL-MCNC: 0.96 NG/DL (ref 0.82–1.77)
TSH SERPL DL<=0.005 MIU/L-ACNC: 3.76 UIU/ML (ref 0.45–4.5)

## 2021-11-24 ENCOUNTER — TELEPHONE (OUTPATIENT)
Dept: FAMILY MEDICINE CLINIC | Facility: CLINIC | Age: 46
End: 2021-11-24

## 2021-11-24 NOTE — TELEPHONE ENCOUNTER
Caller: Madelaine Leeah    Relationship: Self    Best call back number: [phone] (H)    What is the best time to reach you: ANY TIME     Who are you requesting to speak with (clinical staff, provider,  specific staff member): CLINICAL STAFF    What was the call regarding:       PATIENT WAS JUST SPEAKING TO THE OFFICE REGARDING GETTING MEDICATION CALLED IN TO TREAT PINK EYE- PATIENT WAS TOLD SHE WOULD NEED AN APPOINTMENT IN ORDER TO GET MEDICATION CALLED IN AND SHE IS NOT WANTING TO DO THAT AND STATES WITH WORK ITS IMPOSSIBLE FOR HER TO DO SO.    SHE IS ASKING FOR A NURSE TO PLEASE GIVE HER ANOTHER CALL, ASAP

## 2021-11-24 NOTE — TELEPHONE ENCOUNTER
LM VM  Pt will need to be seen by someone.  If unable to come to office today may go to [facility].  Ok for hub to give message

## 2021-11-24 NOTE — TELEPHONE ENCOUNTER
Caller: Malathi Lee    Relationship: Self    Best call back number: [phone]                      What are your concerns:     PATIENT WOKE UP YESTERDAY MORNING WITH PAIN IN THE RIGHT EYE AND IT IS RED.  SHE WORKS AT [facility] AND HAS HAD SEVERAL NURSES LOOK AT IT AND IT IS BELIEVED TO BE PINK EYE.    THE PATIENT IS HOPING THAT YOU CAN CALL HER IN A PRESCRIPTION FOR THE PINK EYE.    [id number] - [place], [place] - [address] - [phone]  - [phone]      IS THE PHARMACY THAT THE PATIENT USES.  PLEASE GIVE PATIENT A PHONE CALL AND LET HER KNOW IF THIS CANNOT BE DONE.

## 2022-01-18 ENCOUNTER — IMMUNIZATION (OUTPATIENT)
Dept: VACCINE CLINIC | Facility: HOSPITAL | Age: 47
End: 2022-01-18

## 2022-01-18 PROCEDURE — 91300 HC SARSCOV02 VAC 30MCG/0.3ML IM: CPT | Performed by: INTERNAL MEDICINE

## 2022-01-18 PROCEDURE — 0004A HC ADM SARSCOV2 30MCG/0.3ML BOOSTER: CPT | Performed by: INTERNAL MEDICINE

## 2022-01-18 PROCEDURE — 91305 HC SARSCOV2 VAC 30 MCG TRS-SUCR PFIZER: CPT | Performed by: INTERNAL MEDICINE

## 2022-01-18 PROCEDURE — 0054A HC ADM SARSCV2 30MCG TRS-SUCR BOOSTER: CPT | Performed by: INTERNAL MEDICINE

## 2022-10-27 ENCOUNTER — TELEMEDICINE (OUTPATIENT)
Dept: FAMILY MEDICINE CLINIC | Facility: CLINIC | Age: 47
End: 2022-10-27

## 2022-10-27 DIAGNOSIS — R50.9 FEVER, UNSPECIFIED FEVER CAUSE: ICD-10-CM

## 2022-10-27 DIAGNOSIS — J02.9 SORE THROAT: Primary | ICD-10-CM

## 2022-10-27 LAB
EXPIRATION DATE: NORMAL
EXPIRATION DATE: NORMAL
FLUAV AG UPPER RESP QL IA.RAPID: NOT DETECTED
FLUBV AG UPPER RESP QL IA.RAPID: NOT DETECTED
INTERNAL CONTROL: NORMAL
INTERNAL CONTROL: NORMAL
Lab: NORMAL
Lab: NORMAL
S PYO AG THROAT QL: NEGATIVE
SARS-COV-2 AG UPPER RESP QL IA.RAPID: NOT DETECTED

## 2022-10-27 PROCEDURE — 87428 SARSCOV & INF VIR A&B AG IA: CPT | Performed by: NURSE PRACTITIONER

## 2022-10-27 PROCEDURE — 87880 STREP A ASSAY W/OPTIC: CPT | Performed by: NURSE PRACTITIONER

## 2022-10-27 PROCEDURE — 99213 OFFICE O/P EST LOW 20 MIN: CPT | Performed by: NURSE PRACTITIONER

## 2022-10-27 NOTE — PROGRESS NOTES
Subjective   Malathi Lee is a 47 y.o. female.     History of Present Illness   Patient presents via tele-visit for c/o sore throat and headache that started yesterday.  She reports that she had sore throat initially and then swollen lymph nodes. Patient denies any shortness of breath or cough, but does report fever and chills. She's been taking ibuprofen and tylenol. Her daughter has URI.     The following portions of the patient's history were reviewed and updated as appropriate: allergies, current medications, past family history, past medical history, past social history, past surgical history and problem list.    Review of Systems   Constitutional: Positive for fever. Negative for appetite change and chills.   HENT: Positive for sore throat. Negative for congestion, ear pain, postnasal drip, rhinorrhea, sinus pressure and sneezing.    Eyes: Negative for redness and itching.   Respiratory: Negative for cough, chest tightness, shortness of breath and wheezing.    Cardiovascular: Negative for chest pain, palpitations and leg swelling.   Musculoskeletal: Negative for myalgias.   Allergic/Immunologic: Negative.    Neurological: Positive for headache. Negative for dizziness.       Objective   Physical Exam  Constitutional:       Appearance: She is well-developed.   HENT:      Head: Normocephalic and atraumatic.   Eyes:      Pupils: Pupils are equal, round, and reactive to light.   Pulmonary:      Effort: Pulmonary effort is normal.   Neurological:      Mental Status: She is alert and oriented to person, place, and time.   Psychiatric:         Behavior: Behavior normal.         Thought Content: Thought content normal.         Judgment: Judgment normal.         There were no vitals filed for this visit.  There is no height or weight on file to calculate BMI.      Procedures    Assessment & Plan   Problems Addressed this Visit    None  Visit Diagnoses     Sore throat    -  Primary    Relevant Orders    POC Rapid Strep  A    POCT SARS-CoV-2 Antigen FRANK    Fever, unspecified fever cause        Relevant Orders    POC Rapid Strep A    POCT SARS-CoV-2 Antigen FRANK      Diagnoses       Codes Comments    Sore throat    -  Primary ICD-10-CM: J02.9  ICD-9-CM: 462     Fever, unspecified fever cause     ICD-10-CM: R50.9  ICD-9-CM: 780.60           You have chosen to receive care through a telehealth visit.  Do you consent to use a video/audio connection for your medical care today? Yes  POCT Strep  POCT covid and Flu  Continue ibuprofen and tylenol over the counter as directed.        Return if symptoms worsen or fail to improve.

## 2022-10-27 NOTE — PATIENT INSTRUCTIONS
Return if symptoms worsen or fail to improve.  Continue ibuprofen and tylenol over the counter as directed.

## 2022-11-09 ENCOUNTER — APPOINTMENT (OUTPATIENT)
Dept: WOMENS IMAGING | Facility: HOSPITAL | Age: 47
End: 2022-11-09

## 2022-11-09 PROCEDURE — 77067 SCR MAMMO BI INCL CAD: CPT | Performed by: RADIOLOGY

## 2022-11-09 PROCEDURE — 77063 BREAST TOMOSYNTHESIS BI: CPT | Performed by: RADIOLOGY

## 2022-12-29 ENCOUNTER — OFFICE VISIT (OUTPATIENT)
Dept: FAMILY MEDICINE CLINIC | Facility: CLINIC | Age: 47
End: 2022-12-29

## 2022-12-29 VITALS
HEIGHT: 62 IN | WEIGHT: 160 LBS | HEART RATE: 82 BPM | BODY MASS INDEX: 29.44 KG/M2 | OXYGEN SATURATION: 98 % | SYSTOLIC BLOOD PRESSURE: 116 MMHG | DIASTOLIC BLOOD PRESSURE: 88 MMHG

## 2022-12-29 DIAGNOSIS — H10.31 ACUTE BACTERIAL CONJUNCTIVITIS OF RIGHT EYE: ICD-10-CM

## 2022-12-29 DIAGNOSIS — J01.00 ACUTE NON-RECURRENT MAXILLARY SINUSITIS: Primary | ICD-10-CM

## 2022-12-29 DIAGNOSIS — R09.81 NASAL CONGESTION: ICD-10-CM

## 2022-12-29 LAB
EXPIRATION DATE: NORMAL
FLUAV AG UPPER RESP QL IA.RAPID: NOT DETECTED
FLUBV AG UPPER RESP QL IA.RAPID: NOT DETECTED
INTERNAL CONTROL: NORMAL
Lab: NORMAL
SARS-COV-2 AG UPPER RESP QL IA.RAPID: NOT DETECTED

## 2022-12-29 PROCEDURE — 99213 OFFICE O/P EST LOW 20 MIN: CPT | Performed by: NURSE PRACTITIONER

## 2022-12-29 PROCEDURE — 87428 SARSCOV & INF VIR A&B AG IA: CPT | Performed by: NURSE PRACTITIONER

## 2022-12-29 RX ORDER — POLYMYXIN B SULFATE AND TRIMETHOPRIM 1; 10000 MG/ML; [USP'U]/ML
1 SOLUTION OPHTHALMIC EVERY 4 HOURS
Qty: 10 ML | Refills: 0 | Status: SHIPPED | OUTPATIENT
Start: 2022-12-29 | End: 2023-01-03

## 2022-12-29 RX ORDER — AMOXICILLIN 500 MG/1
1000 CAPSULE ORAL 2 TIMES DAILY
Qty: 40 CAPSULE | Refills: 0 | Status: SHIPPED | OUTPATIENT
Start: 2022-12-29 | End: 2023-01-08

## 2022-12-29 NOTE — PROGRESS NOTES
Subjective   Malathi Lee is a 47 y.o. female.     History of Present Illness   Established pt here for acute sick visit    Sinus pressure and pain x 10 days, worsening headaches. Has tried zyrtec, sudafed, saline. No fever. No cough. No ear pain. Worsening facial fullness and pressure and now R eye draining and swollen x4 days. She used a family member's eye drop over Karla but only for 2 days.     The following portions of the patient's history were reviewed and updated as appropriate: allergies, current medications, past family history, past medical history, past social history, past surgical history and problem list.    Review of Systems    Objective   Physical Exam  Vitals reviewed.   HENT:      Head: Normocephalic.      Right Ear: Tympanic membrane normal.      Left Ear: Tympanic membrane normal.      Nose: Congestion and rhinorrhea present.      Right Sinus: Maxillary sinus tenderness and frontal sinus tenderness present.      Left Sinus: Maxillary sinus tenderness and frontal sinus tenderness present.      Mouth/Throat:      Mouth: Mucous membranes are moist.      Pharynx: No posterior oropharyngeal erythema.   Eyes:      General:         Right eye: Discharge (Erythematous conjunctiva) present.   Cardiovascular:      Rate and Rhythm: Normal rate and regular rhythm.      Pulses: Normal pulses.      Heart sounds: Normal heart sounds.   Pulmonary:      Effort: Pulmonary effort is normal.      Breath sounds: Normal breath sounds. No wheezing.   Musculoskeletal:         General: Normal range of motion.      Cervical back: Normal range of motion.   Lymphadenopathy:      Cervical: No cervical adenopathy.   Skin:     General: Skin is warm.   Neurological:      General: No focal deficit present.      Mental Status: She is alert.         Vitals:    12/29/22 0959   BP: 116/88   Pulse: 82   SpO2: 98%     Body mass index is 29.26 kg/m².    Procedures    Assessment & Plan   Problems Addressed this Visit     None  Visit Diagnoses     Acute non-recurrent maxillary sinusitis    -  Primary    Acute bacterial conjunctivitis of right eye        Relevant Medications    amoxicillin (AMOXIL) 500 MG capsule    trimethoprim-polymyxin b (Polytrim) 47950-9.1 UNIT/ML-% ophthalmic solution      Diagnoses       Codes Comments    Acute non-recurrent maxillary sinusitis    -  Primary ICD-10-CM: J01.00  ICD-9-CM: 461.0     Acute bacterial conjunctivitis of right eye     ICD-10-CM: H10.31  ICD-9-CM: 372.03         Sinusitis- Rx amoxicillin 1 g twice daily x10 days, use saline nasal rinse, rest and hydrate, alternate Tylenol and ibuprofen as needed, continue with allergy medication    Conjunctivitis-Rx Polytrim 1 drop to right eye every 4 hours while awake, reviewed proper eye care and hygiene, may use warm compresses as needed, reviewed proper handwashing         Education provided in AVS   Return if symptoms worsen or fail to improve.

## 2023-01-03 ENCOUNTER — TELEPHONE (OUTPATIENT)
Dept: FAMILY MEDICINE CLINIC | Facility: CLINIC | Age: 48
End: 2023-01-03
Payer: COMMERCIAL

## 2023-01-03 RX ORDER — FLUCONAZOLE 100 MG/1
100 TABLET ORAL DAILY
Qty: 1 TABLET | Refills: 0 | Status: SHIPPED | OUTPATIENT
Start: 2023-01-03

## 2023-01-03 NOTE — TELEPHONE ENCOUNTER
Caller: Malathi Lee    Relationship: Self    Best call back number: [phone]    What medication are you requesting: ANTIBIOTICS    What are your current symptoms: ITCHY, PAINFUL URINATION    How long have you been experiencing symptoms: 4 DAYS    Have you had these symptoms before:    [] Yes  [x] No    Have you been treated for these symptoms before:   [] Yes  [x] No    If a prescription is needed, what is your preferred pharmacy and phone number:  [id number] - [place], [place] - [address] - [phone] [facility] [phone]

## 2024-08-06 ENCOUNTER — OFFICE VISIT (OUTPATIENT)
Dept: FAMILY MEDICINE CLINIC | Facility: CLINIC | Age: 49
End: 2024-08-06
Payer: COMMERCIAL

## 2024-08-06 VITALS
OXYGEN SATURATION: 99 % | SYSTOLIC BLOOD PRESSURE: 108 MMHG | HEART RATE: 80 BPM | TEMPERATURE: 98.2 F | RESPIRATION RATE: 18 BRPM | HEIGHT: 62 IN | DIASTOLIC BLOOD PRESSURE: 84 MMHG | BODY MASS INDEX: 29.63 KG/M2 | WEIGHT: 161 LBS

## 2024-08-06 DIAGNOSIS — L03.113 CELLULITIS OF RIGHT UPPER EXTREMITY: ICD-10-CM

## 2024-08-06 DIAGNOSIS — T63.441A BEE STING REACTION, ACCIDENTAL OR UNINTENTIONAL, INITIAL ENCOUNTER: Primary | ICD-10-CM

## 2024-08-06 PROCEDURE — 99213 OFFICE O/P EST LOW 20 MIN: CPT | Performed by: NURSE PRACTITIONER

## 2024-08-06 RX ORDER — CEPHALEXIN 500 MG/1
500 CAPSULE ORAL 2 TIMES DAILY
Qty: 10 CAPSULE | Refills: 0 | Status: SHIPPED | OUTPATIENT
Start: 2024-08-06 | End: 2024-08-11

## 2024-08-06 RX ORDER — TRIAMCINOLONE ACETONIDE 1 MG/G
1 CREAM TOPICAL 2 TIMES DAILY
Qty: 60 G | Refills: 0 | Status: SHIPPED | OUTPATIENT
Start: 2024-08-06 | End: 2024-08-16

## 2024-08-06 NOTE — PROGRESS NOTES
Subjective   Malathi Lee is a 49 y.o. female.     History of Present Illness     Patient of KATHRYN Falk, new to this provider. Acute redness and swelling of arm    Acute redness and swelling following bee sting on R upper arm 2 days ago. She pulled stinger out immediately. Upper inner arm  is red, hot, itchy. No better with zyrtec or hydrocortisone cream. She has needed antibiotics in the past after cellulitis w bee sting. No fever or chills.       The following portions of the patient's history were reviewed and updated as appropriate: allergies, current medications, past family history, past medical history, past social history, past surgical history and problem list.    Review of Systems      Current Outpatient Medications:     cetirizine (zyrTEC) 10 MG tablet, Take 10 mg by mouth Daily., Disp: , Rfl:     fluconazole (Diflucan) 100 MG tablet, Take 1 tablet by mouth Daily., Disp: 1 tablet, Rfl: 0    Probiotic Product (PROBIOTIC-10 PO), Take  by mouth., Disp: , Rfl:     Objective   Physical Exam  Vitals reviewed.   Constitutional:       Appearance: Normal appearance.   HENT:      Mouth/Throat:      Mouth: Mucous membranes are moist.   Cardiovascular:      Rate and Rhythm: Normal rate and regular rhythm.      Pulses: Normal pulses.      Heart sounds: Normal heart sounds.   Pulmonary:      Breath sounds: Normal breath sounds.   Musculoskeletal:         General: Normal range of motion.   Skin:     Findings: Erythema present.   Neurological:      General: No focal deficit present.      Mental Status: She is alert.         Vitals:    08/06/24 1405   BP: 108/84   Pulse: 80   Resp: 18   Temp: 98.2 °F (36.8 °C)   SpO2: 99%     Body mass index is 29.45 kg/m².    Procedures    TSH   Date Value Ref Range Status   09/02/2021 3.760 0.450 - 4.500 uIU/mL Final     Hemoglobin A1C   Date Value Ref Range Status   05/17/2018 5.03 4.80 - 5.60 % Final                   Assessment & Plan   Problems Addressed this Visit    None  Visit  Diagnoses       Bee sting reaction, accidental or unintentional, initial encounter    -  Primary    Cellulitis of right upper extremity              Diagnoses         Codes Comments    Bee sting reaction, accidental or unintentional, initial encounter    -  Primary ICD-10-CM: T63.441A  ICD-9-CM: 989.5, E905.3     Cellulitis of right upper extremity     ICD-10-CM: L03.113  ICD-9-CM: 682.3           Beesting- ok to continue zyrtec, benadryl at night, can take H2 blocker/pepcid as needed   Rx triamcinolone bid as needed  Ice for comfort  Keflex to hold if redness or heat/swelling worsens         Education provided in AVS   Return if symptoms worsen or fail to improve.

## 2024-11-21 ENCOUNTER — OFFICE VISIT (OUTPATIENT)
Dept: FAMILY MEDICINE CLINIC | Facility: CLINIC | Age: 49
End: 2024-11-21
Payer: COMMERCIAL

## 2024-11-21 VITALS
DIASTOLIC BLOOD PRESSURE: 80 MMHG | HEIGHT: 62 IN | HEART RATE: 92 BPM | WEIGHT: 163 LBS | OXYGEN SATURATION: 97 % | BODY MASS INDEX: 30 KG/M2 | SYSTOLIC BLOOD PRESSURE: 100 MMHG | RESPIRATION RATE: 18 BRPM

## 2024-11-21 DIAGNOSIS — R05.1 ACUTE COUGH: ICD-10-CM

## 2024-11-21 DIAGNOSIS — J02.9 SORE THROAT: Primary | ICD-10-CM

## 2024-11-21 DIAGNOSIS — J04.0 LARYNGITIS: ICD-10-CM

## 2024-11-21 PROCEDURE — 87880 STREP A ASSAY W/OPTIC: CPT | Performed by: NURSE PRACTITIONER

## 2024-11-21 PROCEDURE — 99213 OFFICE O/P EST LOW 20 MIN: CPT | Performed by: NURSE PRACTITIONER

## 2024-11-21 RX ORDER — DEXTROMETHORPHAN HYDROBROMIDE AND PROMETHAZINE HYDROCHLORIDE 15; 6.25 MG/5ML; MG/5ML
5 SYRUP ORAL 4 TIMES DAILY PRN
Qty: 180 ML | Refills: 0 | Status: SHIPPED | OUTPATIENT
Start: 2024-11-21

## 2024-11-21 NOTE — PROGRESS NOTES
Subjective   Malathi Lee is a 49 y.o. female.     Chief Complaint   Patient presents with    Sore Throat     Since last friday    Laryngitis     Daughter had strep two weeks ago and  currently has strep    Cough        History of Present Illness     History of Present Illness  The patient presents for evaluation of cough.    She reports a persistent cough, particularly severe at night, which has been ongoing for approximately a week. The cough is less severe during the day, but she has had to sleep in an upright position due to the severity of the nocturnal cough. She also mentions difficulty sleeping and occasional pain when swallowing, along with a slightly swollen lymph node. She has been taking Sudafed to manage her symptoms and has not experienced any fever. Her hydration is maintained through regular consumption of hot tea and water.     The following portions of the patient's history were reviewed and updated as appropriate: allergies, current medications, past family history, past medical history, past social history, past surgical history and problem list.    Review of Systems   Constitutional:  Negative for appetite change, chills, fatigue and fever.   HENT:  Positive for congestion, postnasal drip and sore throat. Negative for ear pain, rhinorrhea, sinus pressure and sneezing.    Eyes:  Negative for redness and itching.   Respiratory:  Positive for cough. Negative for chest tightness, shortness of breath and wheezing.    Cardiovascular:  Negative for chest pain, palpitations and leg swelling.   Musculoskeletal:  Negative for myalgias.   Allergic/Immunologic: Negative.    Neurological:  Negative for dizziness and headache.       Objective   Physical Exam  Vitals and nursing note reviewed.   Constitutional:       Appearance: Normal appearance. She is well-developed.   HENT:      Head: Normocephalic and atraumatic.      Right Ear: Tympanic membrane, ear canal and external ear normal.      Left Ear:  Tympanic membrane, ear canal and external ear normal.      Nose: Nose normal.      Right Sinus: No maxillary sinus tenderness or frontal sinus tenderness.      Left Sinus: No maxillary sinus tenderness or frontal sinus tenderness.      Mouth/Throat:      Lips: Pink.      Mouth: Mucous membranes are moist.      Pharynx: Oropharynx is clear. Posterior oropharyngeal erythema and postnasal drip present. No oropharyngeal exudate.   Eyes:      General:         Right eye: No discharge.         Left eye: No discharge.      Conjunctiva/sclera: Conjunctivae normal.      Pupils: Pupils are equal, round, and reactive to light.   Neck:      Thyroid: No thyromegaly.   Cardiovascular:      Rate and Rhythm: Normal rate and regular rhythm.      Heart sounds: Normal heart sounds. No murmur heard.  Pulmonary:      Effort: Pulmonary effort is normal. No tachypnea or respiratory distress.      Breath sounds: Normal breath sounds. No decreased breath sounds, wheezing or rales.   Musculoskeletal:      Cervical back: Normal range of motion and neck supple.   Lymphadenopathy:      Cervical: No cervical adenopathy.   Skin:     General: Skin is warm and dry.   Neurological:      Mental Status: She is alert and oriented to person, place, and time.   Psychiatric:         Behavior: Behavior normal.         Thought Content: Thought content normal.         Judgment: Judgment normal.         Vitals:    11/21/24 1601   BP: 100/80   Pulse: 92   Resp: 18   SpO2: 97%     Body mass index is 29.81 kg/m².      Procedures    Assessment & Plan   Problems Addressed this Visit    None  Visit Diagnoses       Sore throat    -  Primary    Relevant Orders    POCT SARS-CoV-2 + Flu Antigen FRANK    POCT rapid strep A    Laryngitis        Relevant Orders    POCT SARS-CoV-2 + Flu Antigen FRANK    Acute cough        Relevant Medications    promethazine-dextromethorphan (PROMETHAZINE-DM) 6.25-15 MG/5ML syrup    Other Relevant Orders    POCT SARS-CoV-2 + Flu Antigen FRANK           Diagnoses         Codes Comments    Sore throat    -  Primary ICD-10-CM: J02.9  ICD-9-CM: 462     Laryngitis     ICD-10-CM: J04.0  ICD-9-CM: 464.00     Acute cough     ICD-10-CM: R05.1  ICD-9-CM: 786.2             Assessment & Plan  1. Cough.  Her ears are clear and lungs sound normal. There is no evidence of strep throat. Promethazine DM has been prescribed to alleviate her cough and improve sleep quality. She has been advised to maintain adequate hydration by drinking plenty of fluids, including hot tea and water. An antihistamine or cold and flu medication is recommended to address the drainage. She is advised to continue taking Sudafed for congestion.         Assessment & Plan  Sore throat    Orders:    POCT SARS-CoV-2 + Flu Antigen FRANK    POCT rapid strep A    Laryngitis    Orders:    POCT SARS-CoV-2 + Flu Antigen FRANK    Acute cough    Orders:    POCT SARS-CoV-2 + Flu Antigen FRANK    promethazine-dextromethorphan (PROMETHAZINE-DM) 6.25-15 MG/5ML syrup; Take 5 mL by mouth 4 (Four) Times a Day As Needed for Cough.           Return if symptoms worsen or fail to improve.    Patient or patient representative verbalized consent for the use of Ambient Listening during the visit with  VIRGINIE Burns for chart documentation. 11/21/2024  16:19 EST